# Patient Record
Sex: MALE | Race: WHITE
[De-identification: names, ages, dates, MRNs, and addresses within clinical notes are randomized per-mention and may not be internally consistent; named-entity substitution may affect disease eponyms.]

---

## 2023-10-19 ENCOUNTER — HOSPITAL ENCOUNTER (OUTPATIENT)
Dept: HOSPITAL 47 - LABPAT | Age: 38
Discharge: HOME | End: 2023-10-19
Payer: COMMERCIAL

## 2023-10-19 DIAGNOSIS — K40.90: ICD-10-CM

## 2023-10-19 DIAGNOSIS — Z01.812: Primary | ICD-10-CM

## 2023-10-19 DIAGNOSIS — I49.8: ICD-10-CM

## 2023-10-19 LAB
BASOPHILS # BLD AUTO: 0.03 X 10*3/UL (ref 0–0.1)
BASOPHILS NFR BLD AUTO: 0.5 %
EOSINOPHIL # BLD AUTO: 0.08 X 10*3/UL (ref 0.04–0.35)
EOSINOPHIL NFR BLD AUTO: 1.2 %
ERYTHROCYTE [DISTWIDTH] IN BLOOD BY AUTOMATED COUNT: 5.72 X 10*6/UL (ref 4.4–5.6)
ERYTHROCYTE [DISTWIDTH] IN BLOOD: 12.4 % (ref 11.5–14.5)
HCT VFR BLD AUTO: 49.8 % (ref 39.6–50)
HGB BLD-MCNC: 16.7 D/DL (ref 13–17)
IMM GRANULOCYTES BLD QL AUTO: 0.3 %
LYMPHOCYTES # SPEC AUTO: 1.76 X 10*3/UL (ref 0.9–5)
LYMPHOCYTES NFR SPEC AUTO: 27.3 %
MCH RBC QN AUTO: 29.2 PG (ref 27–32)
MCHC RBC AUTO-ENTMCNC: 33.5 D/DL (ref 32–37)
MCV RBC AUTO: 87.1 FL (ref 80–97)
MONOCYTES # BLD AUTO: 0.38 X 10*3/UL (ref 0.2–1)
MONOCYTES NFR BLD AUTO: 5.9 %
NEUTROPHILS # BLD AUTO: 4.17 X 10*3/UL (ref 1.8–7.7)
NEUTROPHILS NFR BLD AUTO: 64.8 %
NRBC BLD AUTO-RTO: 0 X 10*3/UL (ref 0–0.01)
PLATELET # BLD AUTO: 240 X 10*3/UL (ref 140–440)
WBC # BLD AUTO: 6.44 X 10*3/UL (ref 4.5–10)

## 2023-10-19 PROCEDURE — 93005 ELECTROCARDIOGRAM TRACING: CPT

## 2023-10-19 PROCEDURE — 85025 COMPLETE CBC W/AUTO DIFF WBC: CPT

## 2023-10-20 ENCOUNTER — HOSPITAL ENCOUNTER (OUTPATIENT)
Dept: HOSPITAL 47 - LABPAT | Age: 38
Discharge: HOME | End: 2023-10-20
Payer: COMMERCIAL

## 2023-10-20 DIAGNOSIS — K40.90: ICD-10-CM

## 2023-10-20 DIAGNOSIS — Z01.812: Primary | ICD-10-CM

## 2023-10-20 PROCEDURE — 86901 BLOOD TYPING SEROLOGIC RH(D): CPT

## 2023-10-20 PROCEDURE — 86900 BLOOD TYPING SEROLOGIC ABO: CPT

## 2023-10-20 PROCEDURE — 86850 RBC ANTIBODY SCREEN: CPT

## 2023-10-31 ENCOUNTER — HOSPITAL ENCOUNTER (OUTPATIENT)
Dept: HOSPITAL 47 - OR | Age: 38
Discharge: HOME | End: 2023-10-31
Payer: COMMERCIAL

## 2023-10-31 VITALS — HEART RATE: 96 BPM | SYSTOLIC BLOOD PRESSURE: 149 MMHG | DIASTOLIC BLOOD PRESSURE: 78 MMHG

## 2023-10-31 VITALS — TEMPERATURE: 97 F

## 2023-10-31 VITALS — RESPIRATION RATE: 16 BRPM

## 2023-10-31 DIAGNOSIS — F12.90: ICD-10-CM

## 2023-10-31 DIAGNOSIS — K40.90: Primary | ICD-10-CM

## 2023-10-31 PROCEDURE — 64999 UNLISTED PX NERVOUS SYSTEM: CPT

## 2023-10-31 PROCEDURE — 49650 LAP ING HERNIA REPAIR INIT: CPT

## 2023-10-31 NOTE — P.OP
Date of Procedure: 10/31/23


Preoperative Diagnosis: 


Left inguinal hernia


Postoperative Diagnosis: 


Left inguinal hernia


Procedure(s) Performed: 


transversus abdominis plane block





Laparoscopic robotic system repair of left inguinal hernia


Anesthesia: ESAU


Surgeon: Kevin Ann


Estimated Blood Loss (ml): 5


Pathology: none sent


Condition: stable


Disposition: PACU


Description of Procedure: 


The patient's placed on the operating table in the supine position.  The patient

received general anesthesia.  The patient's abdomen was prepped and draped in 

usual sterile fashion.  The skin was anesthetized 1% local Xylocaine at the 

incision sites.  Using an 11 blade a skin incision was made at the umbilicus.  

The fascia was grasped with a [place] and then the peritoneal cavity was entered 

with the Veress needle.  Position of the Veress needle was confirmed with a 

positive drop test.  After adequate insufflation a 5 mm trocar was placed into 

the peritoneal cavity.


The Laparoscope was placed the peritoneal cavity.  And a robotic 8 mm trocar was

placed in the right lateral position and then another 8 mm robotic trochars 

placed in the left lateral position.  The original 5 mm trocar was exchanged for

a 12 mm trocar.  





A 4 quadrant transverse abdominal lock was then performed using 1% local 

Xylocaine.





The patient was placed in reverse Trendelenburg and then the patient was docked 

to the robot.





Next the peritoneum over top of the hernia was incised and then using blunt and 

sharp dissection and electrocautery the hernia sac was dissected free from the 

floor of the inguinal canal.  The hernia sac was completely reduced into the 

peritoneal cavity.  And then using the Pro  mesh the hernia was repaired.  

The peritoneum was then sutured with 20V lock suture.  The patient was then 

undocked the robot.  The needle was withdrawn from the peritoneal cavity.  The 

umbilical trocar site was closed with 0 Ethibond suture.  The skin was closed 

interrupted 3-0 Monocryl suture.  Dermabond dressing was applied.  Patient was 

sent to recovery in stable condition.